# Patient Record
Sex: FEMALE | Race: WHITE | NOT HISPANIC OR LATINO | Employment: UNEMPLOYED | URBAN - METROPOLITAN AREA
[De-identification: names, ages, dates, MRNs, and addresses within clinical notes are randomized per-mention and may not be internally consistent; named-entity substitution may affect disease eponyms.]

---

## 2023-01-31 ENCOUNTER — HOSPITAL ENCOUNTER (OUTPATIENT)
Dept: RADIOLOGY | Facility: HOSPITAL | Age: 43
Discharge: HOME | End: 2023-01-31
Attending: OBSTETRICS & GYNECOLOGY
Payer: COMMERCIAL

## 2023-01-31 DIAGNOSIS — Z12.31 ENCOUNTER FOR SCREENING MAMMOGRAM FOR BREAST CANCER: ICD-10-CM

## 2023-01-31 PROCEDURE — 77067 SCR MAMMO BI INCL CAD: CPT | Mod: TC

## 2023-01-31 PROCEDURE — 77063 MAMMO DIGITAL SCREENING BILAT WITH TOMO: ICD-10-PCS | Mod: 26,,, | Performed by: STUDENT IN AN ORGANIZED HEALTH CARE EDUCATION/TRAINING PROGRAM

## 2023-01-31 PROCEDURE — 77063 BREAST TOMOSYNTHESIS BI: CPT | Mod: 26,,, | Performed by: STUDENT IN AN ORGANIZED HEALTH CARE EDUCATION/TRAINING PROGRAM

## 2023-01-31 PROCEDURE — 77067 MAMMO DIGITAL SCREENING BILAT WITH TOMO: ICD-10-PCS | Mod: 26,,, | Performed by: STUDENT IN AN ORGANIZED HEALTH CARE EDUCATION/TRAINING PROGRAM

## 2023-01-31 PROCEDURE — 77067 SCR MAMMO BI INCL CAD: CPT | Mod: 26,,, | Performed by: STUDENT IN AN ORGANIZED HEALTH CARE EDUCATION/TRAINING PROGRAM

## 2023-02-01 DIAGNOSIS — Z91.89 AT HIGH RISK FOR BREAST CANCER: Primary | ICD-10-CM

## 2023-05-02 ENCOUNTER — OFFICE VISIT (OUTPATIENT)
Dept: SURGERY | Facility: CLINIC | Age: 43
End: 2023-05-02
Payer: COMMERCIAL

## 2023-05-02 VITALS
OXYGEN SATURATION: 98 % | TEMPERATURE: 98 F | RESPIRATION RATE: 18 BRPM | SYSTOLIC BLOOD PRESSURE: 118 MMHG | BODY MASS INDEX: 29.93 KG/M2 | HEIGHT: 71 IN | DIASTOLIC BLOOD PRESSURE: 82 MMHG | HEART RATE: 105 BPM | WEIGHT: 213.81 LBS

## 2023-05-02 DIAGNOSIS — Z80.3 FAMILY HISTORY OF BREAST CANCER: ICD-10-CM

## 2023-05-02 DIAGNOSIS — Z12.31 SCREENING MAMMOGRAM FOR BREAST CANCER: ICD-10-CM

## 2023-05-02 DIAGNOSIS — Z91.89 AT HIGH RISK FOR BREAST CANCER: Primary | ICD-10-CM

## 2023-05-02 PROCEDURE — 3008F PR BODY MASS INDEX (BMI) DOCUMENTED: ICD-10-PCS | Mod: CPTII,S$GLB,, | Performed by: NURSE PRACTITIONER

## 2023-05-02 PROCEDURE — 3074F PR MOST RECENT SYSTOLIC BLOOD PRESSURE < 130 MM HG: ICD-10-PCS | Mod: CPTII,S$GLB,, | Performed by: NURSE PRACTITIONER

## 2023-05-02 PROCEDURE — 99999 PR PBB SHADOW E&M-EST. PATIENT-LVL V: ICD-10-PCS | Mod: PBBFAC,,, | Performed by: NURSE PRACTITIONER

## 2023-05-02 PROCEDURE — 1159F PR MEDICATION LIST DOCUMENTED IN MEDICAL RECORD: ICD-10-PCS | Mod: CPTII,S$GLB,, | Performed by: NURSE PRACTITIONER

## 2023-05-02 PROCEDURE — 99205 PR OFFICE/OUTPT VISIT, NEW, LEVL V, 60-74 MIN: ICD-10-PCS | Mod: S$GLB,,, | Performed by: NURSE PRACTITIONER

## 2023-05-02 PROCEDURE — 3008F BODY MASS INDEX DOCD: CPT | Mod: CPTII,S$GLB,, | Performed by: NURSE PRACTITIONER

## 2023-05-02 PROCEDURE — 1160F RVW MEDS BY RX/DR IN RCRD: CPT | Mod: CPTII,S$GLB,, | Performed by: NURSE PRACTITIONER

## 2023-05-02 PROCEDURE — 99999 PR PBB SHADOW E&M-EST. PATIENT-LVL V: CPT | Mod: PBBFAC,,, | Performed by: NURSE PRACTITIONER

## 2023-05-02 PROCEDURE — 3079F PR MOST RECENT DIASTOLIC BLOOD PRESSURE 80-89 MM HG: ICD-10-PCS | Mod: CPTII,S$GLB,, | Performed by: NURSE PRACTITIONER

## 2023-05-02 PROCEDURE — 3079F DIAST BP 80-89 MM HG: CPT | Mod: CPTII,S$GLB,, | Performed by: NURSE PRACTITIONER

## 2023-05-02 PROCEDURE — 3074F SYST BP LT 130 MM HG: CPT | Mod: CPTII,S$GLB,, | Performed by: NURSE PRACTITIONER

## 2023-05-02 PROCEDURE — 1160F PR REVIEW ALL MEDS BY PRESCRIBER/CLIN PHARMACIST DOCUMENTED: ICD-10-PCS | Mod: CPTII,S$GLB,, | Performed by: NURSE PRACTITIONER

## 2023-05-02 PROCEDURE — 1159F MED LIST DOCD IN RCRD: CPT | Mod: CPTII,S$GLB,, | Performed by: NURSE PRACTITIONER

## 2023-05-02 PROCEDURE — 99205 OFFICE O/P NEW HI 60 MIN: CPT | Mod: S$GLB,,, | Performed by: NURSE PRACTITIONER

## 2023-05-02 RX ORDER — MILK THISTLE 150 MG
CAPSULE ORAL
COMMUNITY

## 2023-05-02 RX ORDER — INDOMETHACIN 75 MG/1
75 CAPSULE, EXTENDED RELEASE ORAL NIGHTLY
COMMUNITY
Start: 2023-03-07

## 2023-05-02 RX ORDER — GABAPENTIN 100 MG/1
CAPSULE ORAL
COMMUNITY
Start: 2023-03-07

## 2023-05-02 RX ORDER — OMEPRAZOLE 20 MG/1
20 CAPSULE, DELAYED RELEASE ORAL
COMMUNITY
Start: 2023-03-07

## 2023-05-02 RX ORDER — SEMAGLUTIDE 2.68 MG/ML
2 INJECTION, SOLUTION SUBCUTANEOUS
COMMUNITY
Start: 2023-04-10

## 2023-05-02 RX ORDER — GABAPENTIN 300 MG/1
300 CAPSULE ORAL
COMMUNITY
Start: 2023-03-07

## 2023-05-02 RX ORDER — LEVOTHYROXINE SODIUM 50 UG/1
50 TABLET ORAL
COMMUNITY
Start: 2023-03-02

## 2023-05-02 RX ORDER — MV/FA/DHA/EPA/FISH OIL/SAW/GNK 400MCG-200
COMBINATION PACKAGE (EA) ORAL
COMMUNITY

## 2023-05-02 NOTE — PROGRESS NOTES
[facility] Breast Surg  Breast Surgical Oncology  New Patient Office Visit - H&P      Referring Provider:  Dr. Azucena Johnson     PCP: Networked reference to record PCT      Care Team: Patient Care Team:  Primary Doctor No as PCP - General    Chief Complaint:   Chief Complaint   Patient presents with    High Risk Visit     New patient presented for a High Risk visit due to TC score: 20.6%. Patient is well, no complaints of any breast issues.        Subjective:      History of Present Illness:  Zina Peguero  is a pleasant female patient who initially presents on  2023 at 42 y.o. years old for evaluation and assessment of risk for breast cancer based on  the Tyrer - Cuzick Breast Cancer Risk Model (> 20% indicates elevated lifetime risk). Her lifetime risk is calculated to be 24.1%. Haily 5 year risk 0.6%. Denies any breast issues including rashes, redness, pain, swelling, nipple discharge, or new lumps/masses. Performs self breast exams monthly.    Imagin23 SCR MG w/Lance: The breasts are heterogeneously dense, which may obscure small masses. There is no mammographic evidence of malignancy    Pathology: NONE      OB / GYN History   Menarche Onset:13  Menopause: Menopause: premenopausal  Hormonal birth control (duration): 24 years  Pregnancies: 0  Age at first child birth: NA  Child births: 0  Hysterectomy/Oophorectomy: hysterectomy without BSO, at age 42 (Fibroids)  HRT: no    Family History of Cancer (& age at diagnosis):  -   Family History   Problem Relation Age of Onset    Thyroid disease Mother     Skin cancer Father 67    Hypertension Father     Rheum arthritis Maternal Grandmother     Kidney cancer Maternal Grandfather 80    Breast cancer Paternal Aunt 55    Lung cancer Paternal Uncle 50    Prostate cancer Paternal Uncle 50    Skin cancer Maternal Uncle 45        Lifestyle:   BMI: Body mass index is 29.82 kg/m².     Other:  # of breast biopsies (when  and pathology results): 0  MG breast density: BIRADS C  Prior thoracic RT: none  Genetic testing: No  Ashkenazi Confucianist descent: No    Other History:     Past Medical History:   Diagnosis Date    Hashimoto's disease     Hemifacial spasm     Right side    Uterine fibroid         Past Surgical History:   Procedure Laterality Date    HYSTERECTOMY      MANDIBLE SURGERY Bilateral     neurosurgery Right         Social History     Socioeconomic History    Marital status: Single   Tobacco Use    Smoking status: Never    Smokeless tobacco: Never   Substance and Sexual Activity    Alcohol use: Not Currently    Drug use: Never    Sexual activity: Not Currently          There is no immunization history on file for this patient.     Medications/Allergies:     Current Outpatient Medications:     biotin/keratin (BIOTIN PLUS KERATIN ORAL), Take 5,000 mcg by mouth. 5000 mcg biotin-500mg kerotin, Disp: , Rfl:     cholecalciferol, vitD3,/vit K2 (VITAMIN D3-VITAMIN K2 ORAL), Take 5,000 Units by mouth Daily. Takes every 3 days, Disp: , Rfl:     curcumin-phosphatidylcholine 500 mg Cap, Take by mouth., Disp: , Rfl:     ferrous sulfate (SLOW RELEASE IRON ORAL), Take 45 mg by mouth., Disp: , Rfl:     gabapentin (NEURONTIN) 100 MG capsule, Take by mouth., Disp: , Rfl:     gabapentin (NEURONTIN) 300 MG capsule, Take 300 mg by mouth., Disp: , Rfl:     indomethacin (INDOCIN SR) 75 mg CpSR CR capsule, Take 75 mg by mouth every evening., Disp: , Rfl:     krill oil 500 mg Cap, Take by mouth., Disp: , Rfl:     mv-min/iron/folic/calcium/vitK (WOMEN'S MULTIVITAMIN ORAL), Take 1 tablet by mouth Daily. 1 a Day, Disp: , Rfl:     omeprazole (PRILOSEC) 20 MG capsule, Take 20 mg by mouth., Disp: , Rfl:     OZEMPIC 2 mg/dose (8 mg/3 mL) PnIj, Inject 2 mg into the skin every 7 days., Disp: , Rfl:     SYNTHROID 50 mcg tablet, Take 50 mcg by mouth., Disp: , Rfl:     Review of patient's allergies indicates:  No Known Allergies     Review of Systems:       Pertinent positives documented in History  Objective/Physical Exam     Vitals:    05/02/23 0920   BP: 118/82   Pulse: 105   Resp: 18   Temp: 97.9 °F (36.6 °C)     General: The patient is awake, alert and oriented times three. The patient is well nourished and in no acute distress.  Neck: There is no evidence of palpable cervical, supraclavicular or axillary adenopathy. The neck is supple. The thyroid is not enlarged.  Musculoskeletal: The patient has a normal range of motion of her bilateral upper extremities.  Chest: Examination of the chest wall fails to reveal any obvious abnormalities. Nonlabored breathing, symmetric expansion.  Breast:  Right: Examination of right breast fails to reveal any dominant masses or areas of significant focal nodularity. The nipple is everted without evidence of discharge. There is no skin dimpling with movement of the pectoralis. There are no significant skin changes overlying the breast.   Left: Examination of the left breast fails to reveal any dominant masses or areas of significant focal nodularity. The nipple is everted without evidence of discharge. There is no skin dimpling with movement of the pectoralis. There are no significant skin changes overlying the breast.  Abdomen: The abdomen is soft, flat, nontender and nondistended.  Integumentary: no rashes or skin lesions present  Neurologic: cranial nerves intact, no signs of peripheral neurological deficit, motor/sensory function intact     Assessment and Plan     There is no problem list on file for this patient.      Zina was seen today for high risk visit.    Diagnoses and all orders for this visit:    At high risk for breast cancer  -     MRI Breast w/wo Contrast, w/CAD, Bilateral; Future    Family history of breast cancer  -     MRI Breast w/wo Contrast, w/CAD, Bilateral; Future    Screening mammogram for breast cancer  -     MRI Breast w/wo Contrast, w/CAD, Bilateral;  Future      --------------------------------------------------------------------------------------------------------------  After the initial clinical evaluation nearly 30 minutes were on counseling the patients regarding the options for management. Risk reduction strategies were discussed.     1. Lifestyle factors: As with other types of cancer, studies continue to show that various lifestyle factors may contribute to the development of breast cancer.     Weight: Recent studies have shown that postmenopausal women who are overweight or obese have an increased risk of breast cancer. These women also have a higher risk of having the cancer come back after treatment.     Physical activity: Decreased physical activity is associated with an increased risk of developing breast cancer and a higher risk of having the cancer come back after treatment. Regular physical activity may protect against breast cancer by helping women maintain a healthy body weight, lowering hormone levels, or causing changes in a womens metabolism or immune factors.     Alcohol: Current research suggests that having more than 1 to 2 alcoholic drinks, including beer, wine, and spirits, per day raises the risk of breast cancer, as well as the risk of having the cancer come back after treatment.     Food: There is no reliable research that confirms that eating or avoiding specific foods reduces the risk of developing breast cancer or having the cancer come back after treatment. However, eating more fruits and vegetables and fewer animal fats is linked with many health benefits.     2. Prevention:  Surgery to lower cancer risk: For women with BRCA1 or BRCA2 genetic mutations, which substantially increase the risk of breast cancer, preventive removal of the breasts may be considered. The procedure, called a prophylactic mastectomy, appears to reduce the risk of developing breast cancer by at least 95%. Women with these mutations should also consider  "the preventive removal of the ovaries and fallopian tubes, called a prophylactic salpingo-oophorectomy. This procedure can reduce the risk of developing ovarian cancer, as well as breast cancer, by stopping the ovaries from making estrogen.      Drugs to lower cancer risk (Chemoprevention): Women who have a higher than usual risk of developing breast cancer may consider certain drugs that may help prevent breast cancer. This approach may also be called "chemoprevention." For breast cancer, this is the use of hormone-blocking drugs to reduce cancer risk. The drugs, tamoxifen (Soltamox) and raloxifene (Evista), are approved by the U.S. Food and Drug Administration (FDA) to lower breast cancer risk. These drugs are called selective estrogen receptor modulators (SERMs) and are not chemotherapy. A SERM is a medication that blocks estrogen receptors in some tissues and not others. Both women who have gone through menopause and those who have not may take tamoxifen. Raloxifene is only approved for women who have gone through menopause. Each drug also has different side effects.     Aromatase inhibitors (AIs) have also been shown to lower breast cancer risk. AIs are a type of hormone-blocking treatment that reduces the amount of estrogen in a woman's body by stopping tissues and organs other than the ovaries from producing estrogen. They can only be used by women who have gone through menopause. However, no AIs have been approved by the FDA for lowering breast cancer risk in women who do not have the disease.     3. Surveillance: Women at greater than 20 percent average lifetime risk of invasive breast cancer based mainly on family history     Clinical Breast exam: Every 6-12 months starting at age found to be at increased risk by risk model     Mammogram: Every year starting 10 years younger than the youngest breast cancer case in the family (but not before age 30)     Breast MRI: Every year starting 10 years younger than " the youngest breast cancer case in the family (but not before age 25). If you have a first-degree relative with a BRCA1/2 gene mutation, youre encouraged to get genetic counseling and/or testing before getting MRI as part of screening (for those who do not wish to have genetic testing, MRI is recommended). Breast MRI in combination with mammography is better than mammography alone at finding breast cancer in certain women at higher than average risk.    --------------------------------------------------------------------------------------------------------------    PLAN:    1. Lifestyle - Healthy lifestyle guidelines were reviewed. She was encouraged to engage in regular exercise, maintain a healthy body weight, and avoid excessive alcohol consumption. Healthy nutritional guidelines were also discussed. Self-breast examination was reviewed with the patient in detail and she was encouraged to perform this on a monthly basis.    2. Surveillance - She desires undergoing high risk screening with annual screening mammograms and breast MRIs. In the absence of significant clinical findings in the interval, I recommend MRI July 2023, Annual MG due 02/01/24. Continue to see OB/GYN for CBE. Recommend two CBE a year. One with us and one with your OB/GYN Provider. We recommend them to be at a six month interval.  She will see Dr. Azucena Johnson in December 2023    3. Prevention - We had a brief discussion/education about indications for preventative mastectomy or chemoprevention.  These methods are not recommended to her at this time.    4. Genetics - According to NCCN guidelines, she meets criteria for genetic testing. Referral to genetic counseling placed.    RTC in 6 months then we can get on a 6 month rotation with GYN.     All of her questions were answered.     Zia Almaguer  ANP    --------------------------------------------------------------------------------------------------------------  --------------------------------------------------------------------------------------------------------------  Total time on the date of the visit ranged from 60-74 mins ([id number]). Total time includes both face-to-face and non-face-to-face time personally spent by myself on the day of the visit.    Non-face-to-face time included:  _X_ preparing to see the patient such as reviewing the patient record  _X_ obtaining and reviewing separately obtained history  _X_ independently interpreting results  _X_ documenting clinical information in electronic health record.    Face-to-face time included:  _X_ performing an appropriate history and examination  _X_ communicating results to the patient  _X_ counseling and educating the patient  __ ordering appropriate medications  _x_ ordering appropriate tests  _X_ ordering appropriate procedures (including follow-up)  _X_ answering any questions the patient had    Total Time spent on date of visit: 60 minutes

## 2023-05-16 ENCOUNTER — OFFICE VISIT (OUTPATIENT)
Dept: HEMATOLOGY/ONCOLOGY | Facility: CLINIC | Age: 43
End: 2023-05-16
Payer: COMMERCIAL

## 2023-05-16 DIAGNOSIS — Z80.0 FAMILY HISTORY OF PANCREATIC CANCER: Primary | ICD-10-CM

## 2023-05-16 DIAGNOSIS — Z80.3 FAMILY HISTORY OF BREAST CANCER: ICD-10-CM

## 2023-05-16 DIAGNOSIS — Z80.8 FAMILY HISTORY OF MELANOMA: ICD-10-CM

## 2023-05-16 DIAGNOSIS — Z91.89 AT HIGH RISK FOR BREAST CANCER: ICD-10-CM

## 2023-05-16 PROCEDURE — 99205 PR OFFICE/OUTPT VISIT, NEW, LEVL V, 60-74 MIN: ICD-10-PCS | Mod: 95,,, | Performed by: NURSE PRACTITIONER

## 2023-05-16 PROCEDURE — 99205 OFFICE O/P NEW HI 60 MIN: CPT | Mod: 95,,, | Performed by: NURSE PRACTITIONER

## 2023-05-16 NOTE — PROGRESS NOTES
REFERRING PHYSICIAN: Zia Almaguer NP    Subjective:       Patient ID: Zina Peguero is a 42 y.o. female.    Chief Complaint: No personal history of cancer but a family history of cancer. Patient presented via Telemedicine Visit (audio and video) today for risk assessment, genetic counseling, and consideration for genetic testing.    HPI  Past Medical History:   Diagnosis Date    Hashimoto's disease     Hemifacial spasm     Right side    Uterine fibroid         Past Surgical History:   Procedure Laterality Date    HYSTERECTOMY      MANDIBLE SURGERY Bilateral     neurosurgery Right         Review of patient's allergies indicates:  Patient has no known allergies.     Review of Systems        Problem List Items Addressed This Visit    None       Oncology History    No history exists.      Family History   Problem Relation Age of Onset    Thyroid disease Mother     Skin cancer Mother         Squamous cell    Skin cancer Father 67        Squamous cell    Hypertension Father     Rheum arthritis Maternal Grandmother     Pancreatic cancer Maternal Grandfather 79    Melanoma Maternal Uncle 50        toe nail    Breast cancer Paternal Aunt 65    Lung cancer Other 50    Prostate cancer Other 50    Lung cancer Other     Breast cancer Other             Assessment:   Risk Assessment:  This patient is at increased risk of having an inherited genetic mutation that increases her risk for cancer. She meets criteria for genetic testing based on the National Comprehensive Cancer Network (NCCN) criteria due to a family history that includes pancreatic cancer (maternal grandfather) and melanoma (maternal uncle), with additional evidence on paternal side with breast cancer (paternal aunt) and prostate cancer (paternal great uncles x 2) (see family history and pedigree). Based on her likelihood of having a mutation, BRCA1/2 Analysis with [facility] CancerNext-Expanded+RNAinsight panel testing was described in detail.    Education  and Counseling:  [facility] CancerNext-Expanded evaluates a broad number of hereditary cancer syndromes to help define patients' cancer risk. This cancer panel tests (77 total): AIP, ALK, APC*, HOLLIE*, AXIN2, BAP1, BARD1, BLM, BMPR1A, BRCA1*, BRCA2*, BRIP1*, CDC73, CDH1*, CDK4, CDKN1B, CDKN2A, CHEK2*, CTNNA1, DICER1, FANCC, FH, FLCN, GALNT12, KIF1B, LZTR1, MAX, MEN1, MET, MLH1*, MSH2*, MSH3, MSH6*, MUTYH*, NBN, NF1*, NF2, NTHL1, PALB2*, PHOX2B, PMS2*, POT1, AJYNZ5D, PTCH1, PTEN*, RAD51C*, RAD51D*, RB1, RECQL, RET, SDHA, SDHAF2, SDHB, SDHC, SDHD, SMAD4, SMARCA4, SMARCB1, SMARCE1, STK11, SUFU, RVNH296, TP53*, TSC1, TSC2, VHL and XRCC2 (sequencing and deletion/duplication); EGFR, EGLN1, HOXB13, KIT, MITF, PDGFRA, POLD1 and POLE (sequencing only); EPCAM and GREM1 (deletion/duplication only). DNA and RNA analyses performed for * genes.    Risks of cancer associated with inherited cancer predisposition mutations were discussed in detail.  If a mutation were found, this patient would have a significantly increased risk for cancer.  Inherited cancer syndromes included in this test, may have different, but still significant risk for cancer.  Risk of cancer with any particular gene mutation will be discussed at the time of results disclosure and based on the results.    The availability of clinical management options for inherited cancer predisposition mutation carriers was discussed, including increased surveillance, chemoprevention, and prophylactic surgery. Details of the testing process, including benefits and limitations of genetic analysis as well as the implications of possible test results, were discussed.  Because this patient is the first member of her family to be tested comprehensive panel testing was presented.  Related insurance issues were discussed.      Summary:  This patient was evaluated for hereditary risk of cancer and was found to be at an increased risk of having an inherited cancer predisposition  gene mutation.  The option of genetic testing was explained in detail, including the possible impact of this information on family members.  Since this patient wishes to proceed with testing an informed consent was obtained and blood drawn and sent to [facility].  Results will be expected 4 weeks from this time.  A follow-up appointment will be scheduled for results disclosure.       The patient location is: home.     Visit type: audiovisual    Face to Face time with patient: 45 minutes  >60 minutes of total time spent on the encounter, which includes face to face time and non-face to face time preparing to see the patient (eg, review of tests), Obtaining and/or reviewing separately obtained history, Documenting clinical information in the electronic or other health record, Independently interpreting results (not separately reported) and communicating results to the patient/family/caregiver, or Care coordination (not separately reported).       Each patient to whom he or she provides medical services by telemedicine is:  (1) informed of the relationship between the physician and patient and the respective role of any other health care provider with respect to management of the patient; and (2) notified that he or she may decline to receive medical services by telemedicine and may withdraw from such care at any time.    WILLIS FARMER, PhD

## 2023-07-05 ENCOUNTER — OFFICE VISIT (OUTPATIENT)
Dept: HEMATOLOGY/ONCOLOGY | Facility: CLINIC | Age: 43
End: 2023-07-05
Payer: COMMERCIAL

## 2023-07-05 DIAGNOSIS — Z80.3 FAMILY HISTORY OF BREAST CANCER: Primary | ICD-10-CM

## 2023-07-05 DIAGNOSIS — Z80.0 FAMILY HISTORY OF PANCREATIC CANCER: ICD-10-CM

## 2023-07-05 DIAGNOSIS — Z80.8 FAMILY HISTORY OF MELANOMA: ICD-10-CM

## 2023-07-05 PROCEDURE — 99213 PR OFFICE/OUTPT VISIT, EST, LEVL III, 20-29 MIN: ICD-10-PCS | Mod: 95,,, | Performed by: NURSE PRACTITIONER

## 2023-07-05 PROCEDURE — 99213 OFFICE O/P EST LOW 20 MIN: CPT | Mod: 95,,, | Performed by: NURSE PRACTITIONER

## 2023-07-05 NOTE — PROGRESS NOTES
REFERRING PHYSICIAN: Zia Almaguer NP     Patient ID: Zina Peguero is a 42 y.o. female.    Chief Complaint: No personal history of cancer but a family history of cancer. Patient presented for genetic counseling on 5/16/2023 and was found appropriate for genetic testing based on the National Comprehensive Cancer Network (NCCN) criteria due to a family history that includes pancreatic cancer (maternal grandfather) and melanoma (maternal uncle), with additional evidence on paternal side with breast cancer (paternal aunt) and prostate cancer (paternal great uncles x 2) (see family history and pedigree). She signed consent, lab was drawn and sent to [facility] for BRCA1/2 Analyses with CancerNext-Expanded+RNAinsight panel testing. She presented via Telemedicine Visit (audio and video) today for results disclosure.     HPI  Past Medical History:   Diagnosis Date    Hashimoto's disease     Hemifacial spasm     Right side    Uterine fibroid       Review of patient's allergies indicates:  No Known Allergies   Review of Systems   Constitutional:  Negative for appetite change and unexpected weight change.   HENT:  Positive for mouth sores.    Eyes:  Negative for visual disturbance.   Respiratory:  Negative for cough and shortness of breath.    Cardiovascular:  Negative for chest pain.   Gastrointestinal:  Negative for abdominal pain and diarrhea.   Genitourinary:  Negative for frequency.   Musculoskeletal:  Negative for back pain.   Integumentary:  Negative for rash.   Neurological:  Positive for headaches.   Hematological:  Negative for adenopathy.   Psychiatric/Behavioral:  The patient is not nervous/anxious.        Assessment:       Problem List Items Addressed This Visit    None    Oncology History    No history exists.            Family History:  Family History   Problem Relation Age of Onset    Thyroid disease Mother     Skin cancer Mother         Squamous cell    Skin cancer Father 67        Squamous cell     Hypertension Father     Rheum arthritis Maternal Grandmother     Pancreatic cancer Maternal Grandfather 79    Melanoma Maternal Uncle 50        toe nail    Breast cancer Paternal Aunt 65    Lung cancer Other 50    Prostate cancer Other 50    Lung cancer Other     Breast cancer Other         Plan:     Genetic testing was appropriate for this patient because of her personal and family history. This comprehensive [facility] CancerNext analysis indicated that there was no deleterious mutation in  (77 total): AIP, ALK, APC, HOLLIE, AXIN2, BAP1, BARD1, BLM, BMPR1A, BRCA1, BRCA2, BRIP1, CDC73, CDH1, CDK4, CDKN1B, CDKN2A, CHEK2, CTNNA1, DICER1, FANCC, FH, FLCN, GALNT12, KIF1B, LZTR1, MAX, MEN1, MET, MLH1, MSH2, MSH3, MSH6, MUTYH, NBN, NF1, NF2, NTHL1, PALB2, PHOX2B, PMS2, POT1, ZTLML2A, PTCH1, PTEN, RAD51C, RAD51D, RB1, RECQL, RET, SDHA, SDHAF2, SDHB, SDHC, SDHD, SMAD4, SMARCA4, SMARCB1, SMARCE1, STK11, SUFU, [id number], TP53, TSC1, TSC2, VHL and XRCC2 (sequencing and deletion/duplication); EGFR, EGLN1, HOXB13, KIT, MITF, PDGFRA, POLD1 and POLE (sequencing only); EPCAM and GREM1 (deletion/duplication only). RNA data is routinely analyzed for use in variant interpretation for all genes. However, there were three (3) variants of uncertain significance (VUS): AXIN2 p.F570V, BAP1 p.R383H, CDH1 p.P822S. Both the AXIN2 and BAP1 vaiants are predicted to be tolerated (benign) by in silico analysis. The CDH1 variant in silico prediction is inconclusive. However, there are currently insufficient data to determine if any of these variants does or does not cause increased cancer risk. Therefore, the contribution of each variant to the relative risk of cancer cannot be established from this analysis. If as a result of ongoing reclassification efforts, [facility] should determine that any of these variants becomes clinically actionable, an amended report will be sent to me as healthcare provider. I will then contact the patient for a  discussion of implications of this new information and a healthcare plan will be made accordingly.      A copy of her result will be emailed to [email or web address]     The patient location is: home.     Visit type: audiovisual    Face to Face time with patient: 15 minutes  20 minutes of total time spent on the encounter, which includes face to face time and non-face to face time preparing to see the patient (eg, review of tests), Obtaining and/or reviewing separately obtained history, Documenting clinical information in the electronic or other health record, Independently interpreting results (not separately reported) and communicating results to the patient/family/caregiver, or Care coordination (not separately reported).       Each patient to whom he or she provides medical services by telemedicine is:  (1) informed of the relationship between the physician and patient and the respective role of any other health care provider with respect to management of the patient; and (2) notified that he or she may decline to receive medical services by telemedicine and may withdraw from such care at any time.     WILLIS FARMER, PhD

## 2023-07-18 ENCOUNTER — APPOINTMENT (OUTPATIENT)
Dept: RADIOLOGY | Facility: HOSPITAL | Age: 43
End: 2023-07-18
Attending: NURSE PRACTITIONER
Payer: COMMERCIAL

## 2023-07-18 DIAGNOSIS — Z12.31 SCREENING MAMMOGRAM FOR BREAST CANCER: ICD-10-CM

## 2023-07-18 DIAGNOSIS — Z91.89 AT HIGH RISK FOR BREAST CANCER: ICD-10-CM

## 2023-07-18 DIAGNOSIS — Z80.3 FAMILY HISTORY OF BREAST CANCER: ICD-10-CM

## 2023-07-18 PROCEDURE — A9577 INJ MULTIHANCE: HCPCS | Performed by: NURSE PRACTITIONER

## 2023-07-18 PROCEDURE — 77049 MRI BREAST C-+ W/CAD BI: CPT | Mod: 26,,, | Performed by: STUDENT IN AN ORGANIZED HEALTH CARE EDUCATION/TRAINING PROGRAM

## 2023-07-18 PROCEDURE — 77049 MRI BREAST C-+ W/CAD BI: CPT | Mod: TC

## 2023-07-18 PROCEDURE — 77049 MRI BREAST W/WO CONTRAST, W/CAD, BILATERAL: ICD-10-PCS | Mod: 26,,, | Performed by: STUDENT IN AN ORGANIZED HEALTH CARE EDUCATION/TRAINING PROGRAM

## 2023-07-18 PROCEDURE — 25500020 PHARM REV CODE 255: Performed by: NURSE PRACTITIONER

## 2023-07-18 RX ADMIN — GADOBENATE DIMEGLUMINE 20 ML: 529 INJECTION, SOLUTION INTRAVENOUS at 10:07

## 2023-11-06 NOTE — PROGRESS NOTES
[facility] Breast Surg  Breast Surgical Oncology  High Risk Follow Up Visit       Referring Provider:  No ref. provider found     PCP: Networked reference to record PCT      Care Team: Patient Care Team:  No, Primary Doctor as PCP - Azucena Davis MD as Consulting Physician (Obstetrics and Gynecology)    Chief Complaint:   Chief Complaint   Patient presents with    Follow-up     6 month CBE/High Risk Followup - patient reports no breast concerns        Subjective:      Interval: 2023 Patient presents for high risk follow up visit. She is doing well today. Patient has history of fibrocystic breast. She denies any breast issues including rashes, redness, pain, swelling, nipple discharge, or new lumps/masses. Performs self breast exams monthly. She had a hysterectomy without BSO earlier this year, so she no longer has cycles.  She is not experiencing any menopausal symptoms. She had genetic testing done in interim, and testing resulted as negative. She states she is still living a healthy lifestyle. She does not smoke and she drinks alcohol on occasion.     History of Present Illness:  Zina Peguero  is a pleasant female patient who initially presented at 42 years old for evaluation of risk for breast cancer based on  the Tyrer - Cuzick Breast Cancer Risk Model (> 20% indicates elevated lifetime risk). Her lifetime risk is calculated to be 24.1%. Haily 5 year risk 0.7%.    Imagin23 SCR MG w/Lance: The breasts are heterogeneously dense, which may obscure small masses. There is no mammographic evidence of malignancy.  23 Breast MRI: No MRI evidence of malignancy in either breast. Bilateral cystic mastopathy is benign.  BI-RADS 2: Benign.      Pathology: NONE      OB / GYN History   Menarche Onset:13  Menopause: Menopause: premenopausal  Hormonal birth control (duration): 24 years  Pregnancies: 0  Age at first child birth: NA  Child births:  0  Hysterectomy/Oophorectomy: hysterectomy without BSO, at age 42 (Fibroids)  HRT: no    Family History of Cancer (& age at diagnosis):  -   Family History   Problem Relation Age of Onset    Thyroid disease Mother     Skin cancer Mother         Squamous cell    Skin cancer Father 67        Squamous cell    Hypertension Father     Rheum arthritis Maternal Grandmother     Pancreatic cancer Maternal Grandfather 79    Melanoma Maternal Uncle 50        toe nail    Breast cancer Paternal Aunt 65    Lung cancer Other 50    Prostate cancer Other 50    Lung cancer Other     Breast cancer Other         Lifestyle:   BMI: Body mass index is 30.35 kg/m².     Other:  # of breast biopsies (when and pathology results): 0  MG breast density: BIRADS C  Prior thoracic RT: none  Genetic testing: Patient has had genetic testing done.  Results were negative besides three (3) variants of uncertain significance (VUS): AXIN2 p.F570V, BAP1 p.R383H, CDH1 p.P822S.  Ashkenazi Sikhism descent: No    Other History:     Past Medical History:   Diagnosis Date    Hashimoto's disease     Hemifacial spasm     Right side    Uterine fibroid         Past Surgical History:   Procedure Laterality Date    BRAIN SURGERY  7/2017; 12/2017    HYSTERECTOMY      Partial Hysterectomy 4/2023    MANDIBLE SURGERY Bilateral     neurosurgery Right         Social History     Socioeconomic History    Marital status: Single   Tobacco Use    Smoking status: Never    Smokeless tobacco: Never   Substance and Sexual Activity    Alcohol use: Not Currently     Alcohol/week: 2.0 standard drinks of alcohol     Types: 2 Glasses of wine per week    Drug use: Never    Sexual activity: Not Currently     Partners: Male     Birth control/protection: None          There is no immunization history on file for this patient.     Medications/Allergies:     Current Outpatient Medications:     biotin/keratin (BIOTIN PLUS KERATIN ORAL), Take 5,000 mcg by mouth. 5000 mcg biotin-500mg kerotin,  Disp: , Rfl:     cholecalciferol, vitD3,/vit K2 (VITAMIN D3-VITAMIN K2 ORAL), Take 5,000 Units by mouth Daily. Takes every 3 days, Disp: , Rfl:     curcumin-phosphatidylcholine 500 mg Cap, Take by mouth., Disp: , Rfl:     ferrous sulfate (SLOW RELEASE IRON ORAL), Take 45 mg by mouth., Disp: , Rfl:     gabapentin (NEURONTIN) 100 MG capsule, Take by mouth., Disp: , Rfl:     gabapentin (NEURONTIN) 300 MG capsule, Take 300 mg by mouth., Disp: , Rfl:     indomethacin (INDOCIN SR) 75 mg CpSR CR capsule, Take 75 mg by mouth every evening., Disp: , Rfl:     krill oil 500 mg Cap, Take by mouth., Disp: , Rfl:     mv-min/iron/folic/calcium/vitK (WOMEN'S MULTIVITAMIN ORAL), Take 1 tablet by mouth Daily. 1 a Day, Disp: , Rfl:     omeprazole (PRILOSEC) 20 MG capsule, Take 20 mg by mouth., Disp: , Rfl:     SYNTHROID 50 mcg tablet, Take 50 mcg by mouth., Disp: , Rfl:     OZEMPIC 2 mg/dose (8 mg/3 mL) PnIj, Inject 2 mg into the skin every 7 days., Disp: , Rfl:     Review of patient's allergies indicates:  No Known Allergies     Review of Systems:    Pertinent positives documented in history.      Objective/Physical Exam     Vitals:    11/07/23 0843   BP: 135/89   Pulse: 64   Resp: 18   Temp: 98.1 °F (36.7 °C)       General: The patient is awake, alert and oriented times three. The patient is well nourished and in no acute distress.  Neck: There is no evidence of palpable cervical, supraclavicular or axillary adenopathy. The neck is supple. The thyroid is not enlarged.  Musculoskeletal: The patient has a normal range of motion of her bilateral upper extremities.  Chest: Examination of the chest wall fails to reveal any obvious abnormalities. Nonlabored breathing, symmetric expansion.  Breast:  Right:  Examination of right breast fails to reveal any dominant masses or areas of significant focal nodularity. The nipple is everted without evidence of discharge. There is no skin dimpling with movement of the pectoralis. There are no  significant skin changes overlying the breast.   Left: In the 5:00 region, there was about a 3cm mobile, well circumscribed lump 6 CMFN which is consistent with characterization of a benign cyst. (Patient has had cystic mastopathy noted on imaging in past). The nipple is everted without evidence of discharge. There is no skin dimpling with movement of the pectoralis. There are no significant skin changes overlying the breast.  Abdomen: The abdomen is soft, flat, nontender and nondistended.  Integumentary: no rashes or skin lesions present  Neurologic: cranial nerves intact, no signs of peripheral neurological deficit, motor/sensory function intact     Assessment and Plan     There is no problem list on file for this patient.      Zina was seen today for follow-up.    Diagnoses and all orders for this visit:    At high risk for breast cancer  -     Mammo Digital Screening Bilat w/ Lance; Future  -     MRI Breast w/wo Contrast, w/CAD, Bilateral; Future    Family history of breast cancer  -     Mammo Digital Screening Bilat w/ Lance; Future  -     MRI Breast w/wo Contrast, w/CAD, Bilateral; Future    Screening mammogram for breast cancer  -     Mammo Digital Screening Bilat w/ Lance; Future        --------------------------------------------------------------------------------------------------------------    PLAN:    1. Lifestyle - Healthy lifestyle guidelines were reviewed. She was encouraged to engage in regular exercise, maintain a healthy body weight, and avoid excessive alcohol consumption. Healthy nutritional guidelines were also discussed. Self-breast examination was reviewed with the patient in detail and she was encouraged to perform this on a monthly basis.    2. Surveillance - She desires undergoing high risk screening with annual screening mammograms and breast MRIs. In the absence of significant clinical findings in the interval, I recommend MRI July 2024, Annual MG due February 24. Continue to see OB/GYN  for CBE. Recommend two CBE a year. One with us and one with your OB/GYN Provider. We recommend them to be at a six month interval.  She will see Dr. Azucena Johnson in December 2023, so will see us back in 6 months to establish CBE q6 months scheduled.     3. Prevention - We had a brief discussion/education about indications for preventative mastectomy or chemoprevention.  These methods are not recommended to her at this time.    4. Genetics - Patient has had genetic testing done.  Results were negative besides three (3) variants of uncertain significance (VUS): AXIN2 p.F570V, BAP1 p.R383H, CDH1 p.P822S.    5. Follow-up:  RTC in 6 months.      All of her questions were answered.     Jacey Singh PA-C    --------------------------------------------------------------------------------------------------------------  --------------------------------------------------------------------------------------------------------------  Total time on the date of the visit ranged from 20-29 mins (28052). Total time includes both face-to-face and non-face-to-face time personally spent by myself on the day of the visit.     Non-face-to-face time included:  _X_ preparing to see the patient such as reviewing the patient record  __ obtaining and reviewing separately obtained history  _X_ independently interpreting results  _X_ documenting clinical information in electronic health record.     Face-to-face time included:  _X_ performing an appropriate history and examination  _X_ communicating results to the patient  _X_ counseling and educating the patient  __ ordering appropriate medications  __ ordering appropriate tests  _X_ ordering appropriate procedures (including follow-up)  _X_ answering any questions the patient had     Total Time spent on date of visit: 25 minutes

## 2023-11-07 ENCOUNTER — OFFICE VISIT (OUTPATIENT)
Dept: SURGERY | Facility: CLINIC | Age: 43
End: 2023-11-07
Payer: COMMERCIAL

## 2023-11-07 VITALS
DIASTOLIC BLOOD PRESSURE: 89 MMHG | OXYGEN SATURATION: 100 % | HEART RATE: 64 BPM | RESPIRATION RATE: 18 BRPM | SYSTOLIC BLOOD PRESSURE: 135 MMHG | TEMPERATURE: 98 F | BODY MASS INDEX: 30.47 KG/M2 | WEIGHT: 217.63 LBS | HEIGHT: 71 IN

## 2023-11-07 DIAGNOSIS — Z91.89 AT HIGH RISK FOR BREAST CANCER: Primary | ICD-10-CM

## 2023-11-07 DIAGNOSIS — Z12.31 SCREENING MAMMOGRAM FOR BREAST CANCER: ICD-10-CM

## 2023-11-07 DIAGNOSIS — Z80.3 FAMILY HISTORY OF BREAST CANCER: ICD-10-CM

## 2023-11-07 PROCEDURE — 3008F PR BODY MASS INDEX (BMI) DOCUMENTED: ICD-10-PCS | Mod: CPTII,S$GLB,,

## 2023-11-07 PROCEDURE — 99999 PR PBB SHADOW E&M-EST. PATIENT-LVL V: CPT | Mod: PBBFAC,,,

## 2023-11-07 PROCEDURE — 3079F DIAST BP 80-89 MM HG: CPT | Mod: CPTII,S$GLB,,

## 2023-11-07 PROCEDURE — 1159F MED LIST DOCD IN RCRD: CPT | Mod: CPTII,S$GLB,,

## 2023-11-07 PROCEDURE — 3075F SYST BP GE 130 - 139MM HG: CPT | Mod: CPTII,S$GLB,,

## 2023-11-07 PROCEDURE — 99999 PR PBB SHADOW E&M-EST. PATIENT-LVL V: ICD-10-PCS | Mod: PBBFAC,,,

## 2023-11-07 PROCEDURE — 1159F PR MEDICATION LIST DOCUMENTED IN MEDICAL RECORD: ICD-10-PCS | Mod: CPTII,S$GLB,,

## 2023-11-07 PROCEDURE — 99213 PR OFFICE/OUTPT VISIT, EST, LEVL III, 20-29 MIN: ICD-10-PCS | Mod: S$GLB,,,

## 2023-11-07 PROCEDURE — 3008F BODY MASS INDEX DOCD: CPT | Mod: CPTII,S$GLB,,

## 2023-11-07 PROCEDURE — 3075F PR MOST RECENT SYSTOLIC BLOOD PRESS GE 130-139MM HG: ICD-10-PCS | Mod: CPTII,S$GLB,,

## 2023-11-07 PROCEDURE — 1160F RVW MEDS BY RX/DR IN RCRD: CPT | Mod: CPTII,S$GLB,,

## 2023-11-07 PROCEDURE — 99213 OFFICE O/P EST LOW 20 MIN: CPT | Mod: S$GLB,,,

## 2023-11-07 PROCEDURE — 3079F PR MOST RECENT DIASTOLIC BLOOD PRESSURE 80-89 MM HG: ICD-10-PCS | Mod: CPTII,S$GLB,,

## 2023-11-07 PROCEDURE — 1160F PR REVIEW ALL MEDS BY PRESCRIBER/CLIN PHARMACIST DOCUMENTED: ICD-10-PCS | Mod: CPTII,S$GLB,,

## 2024-02-01 ENCOUNTER — HOSPITAL ENCOUNTER (OUTPATIENT)
Dept: RADIOLOGY | Facility: HOSPITAL | Age: 44
Discharge: HOME | End: 2024-02-01
Payer: COMMERCIAL

## 2024-02-01 DIAGNOSIS — Z80.3 FAMILY HISTORY OF BREAST CANCER: ICD-10-CM

## 2024-02-01 DIAGNOSIS — Z91.89 AT HIGH RISK FOR BREAST CANCER: ICD-10-CM

## 2024-02-01 DIAGNOSIS — Z12.31 SCREENING MAMMOGRAM FOR BREAST CANCER: ICD-10-CM

## 2024-02-01 PROCEDURE — 77067 SCR MAMMO BI INCL CAD: CPT | Mod: 26,,, | Performed by: STUDENT IN AN ORGANIZED HEALTH CARE EDUCATION/TRAINING PROGRAM

## 2024-02-01 PROCEDURE — 77067 SCR MAMMO BI INCL CAD: CPT | Mod: TC

## 2024-02-01 PROCEDURE — 77063 BREAST TOMOSYNTHESIS BI: CPT | Mod: 26,,, | Performed by: STUDENT IN AN ORGANIZED HEALTH CARE EDUCATION/TRAINING PROGRAM

## 2024-02-09 DIAGNOSIS — G51.39 HEMIFACIAL SPASM: Primary | ICD-10-CM

## 2024-04-09 ENCOUNTER — OFFICE VISIT (OUTPATIENT)
Dept: NEUROLOGY | Facility: CLINIC | Age: 44
End: 2024-04-09
Payer: COMMERCIAL

## 2024-04-09 VITALS
BODY MASS INDEX: 29.82 KG/M2 | WEIGHT: 213 LBS | SYSTOLIC BLOOD PRESSURE: 140 MMHG | DIASTOLIC BLOOD PRESSURE: 84 MMHG | HEIGHT: 71 IN

## 2024-04-09 DIAGNOSIS — G51.39 HEMIFACIAL SPASM: Primary | ICD-10-CM

## 2024-04-09 PROCEDURE — 3077F SYST BP >= 140 MM HG: CPT | Mod: CPTII,S$GLB,, | Performed by: SPECIALIST

## 2024-04-09 PROCEDURE — 3008F BODY MASS INDEX DOCD: CPT | Mod: CPTII,S$GLB,, | Performed by: SPECIALIST

## 2024-04-09 PROCEDURE — 99999 PR PBB SHADOW E&M-EST. PATIENT-LVL IV: CPT | Mod: PBBFAC,,, | Performed by: SPECIALIST

## 2024-04-09 PROCEDURE — 99205 OFFICE O/P NEW HI 60 MIN: CPT | Mod: S$GLB,,, | Performed by: SPECIALIST

## 2024-04-09 PROCEDURE — 3079F DIAST BP 80-89 MM HG: CPT | Mod: CPTII,S$GLB,, | Performed by: SPECIALIST

## 2024-04-09 PROCEDURE — 1159F MED LIST DOCD IN RCRD: CPT | Mod: CPTII,S$GLB,, | Performed by: SPECIALIST

## 2024-04-09 RX ORDER — TIZANIDINE 2 MG/1
2 TABLET ORAL 2 TIMES DAILY
COMMUNITY

## 2024-04-09 RX ORDER — TIRZEPATIDE 7.5 MG/.5ML
0.75 INJECTION, SOLUTION SUBCUTANEOUS
COMMUNITY
Start: 2024-04-05

## 2024-04-09 NOTE — PROGRESS NOTES
"Subjective:      @Patient ID: Zina Peguero is a 43 y.o. female.    Chief Complaint: R HF spasm     HPI:              NP ref by Dr Mendoza for neuro cons to eval for Hemifacial (Pt had 2 decompression surgeries for hemifacial spasms and the spasms have returned. Rt eye twitching and it pulls her face and lips. Pt has been having Dysport injection w Dr Topete and it helps a little and she has been having to pay cash. )    See also 'facesheet' under media poss handwr notes     Review of Systems       [x] Single     []     [] []   [x] Working     [] Retired, worked as:   [x] Drives     [] Does not drive   ----------------------------  Hashimoto's disease  Hemifacial spasm      Comment:  Right side  Uterine fibroid  ..  Current Outpatient Medications   Medication Instructions    biotin/keratin (BIOTIN PLUS KERATIN ORAL) 5,000 mcg, Oral, 5000 mcg biotin-500mg kerotin    cholecalciferol, vitD3,/vit K2 (VITAMIN D3-VITAMIN K2 ORAL) 5,000 Units, Oral, Daily, Takes every 3 days    curcumin-phosphatidylcholine 500 mg Cap Oral    ferrous sulfate (SLOW RELEASE IRON ORAL) 45 mg, Oral    gabapentin (NEURONTIN) 100 MG capsule Oral    gabapentin (NEURONTIN) 300 mg, Oral    indomethacin (INDOCIN SR) 75 mg, Oral, Nightly    krill oil 500 mg Cap Oral    mv-min/iron/folic/calcium/vitK (WOMEN'S MULTIVITAMIN ORAL) 1 tablet, Oral, Daily, 1 a Day    omeprazole (PRILOSEC) 20 mg, Oral    OZEMPIC 2 mg, Subcutaneous, Every 7 days    SYNTHROID 50 mcg, Oral    tiZANidine (ZANAFLEX) 2 mg, Oral, 2 times daily    TURMERIC ORAL Oral    ZEPBOUND 0.75 mg, Subcutaneous, Every 7 days      Objective:      Exam:   Visit Vitals  BP (!) 140/84   Ht 5' 11" (1.803 m)   Wt 96.6 kg (213 lb)   BMI 29.71 kg/m²     General Exam  If Accompanied, by__       body habitus_ Body mass index is 29.71 kg/m².  Gen exam     Neurological:  []Normal neuro exam         Or   Exam comments:  R hf spasm and R facial synkinesis   T hearing loss to finger " rustle and vibratome and trouble w tandem gait       Neuroimaging: Images and imaging reports reviewed.   Rads summary: 2022 b MRI [facility] [place]: Stable postop ch related to right facial nerve decompression   Interval development of mild, nonspecific subcortical white matter signal hyperintensities, which may represent normal aging process, migraine changes, or mild chronic microvascular ischemia.   Images reviewed 4.9.24  My comments: no brainstem signal changes   They commented on wmh's   I don't see much     Labs:    [x]  New Patient         [x]  Multiple Issues/ diagnoses or problems  [if not enumerated in note then discussed but not documented]    Complexity of Data:   [x] High    [] Moderate   [] Images and reports reviewed [] History obtained from accompaniment  [] Studies ordered [] Studies consid or discussed, not ordered   [] Differential Diagnoses discussed       Risks:   [] High     [x] Moderate   [] (poss or def) neurodegenerative condition [] () autoimmune condition with possibility of flares or unexpected attack  [] () seiz d.o. with possib of recurr seiz's  [] Cerebrovasc ds with risk of recurrent stroke  [] CNS meds (and/or) potentially high risk non CNS meds taken or discussed which may cause med or behav SE's  [] Fall risk [] Driving discussed  [] Diagnosis unclear or DDx wide making risk uncertain   []:    MDM:    [x] High         Assessment/Plan:         ICD-10-CM ICD-9-CM   1. Hemifacial spasm  G51.39 351.8         Other Comments / Follow Up:           Orders Placed This Encounter   Procedures    Prior authorization Order     She may be interested in visiting with local neurosurgeon   Aim revisit in weeks ahead for facial xeomin injections   Patient Instructions          MD CHRISTOS HajiA FAAN, [facility]  Neuroscience Center Medical Director   [facility]

## 2024-04-18 ENCOUNTER — PROCEDURE VISIT (OUTPATIENT)
Dept: NEUROLOGY | Facility: CLINIC | Age: 44
End: 2024-04-18
Payer: COMMERCIAL

## 2024-04-18 VITALS — WEIGHT: 215 LBS | BODY MASS INDEX: 30.1 KG/M2 | HEIGHT: 71 IN

## 2024-04-18 DIAGNOSIS — G51.39 HEMIFACIAL SPASM, UNSPECIFIED LATERALITY: ICD-10-CM

## 2024-04-18 DIAGNOSIS — G51.31 HEMIFACIAL SPASM OF RIGHT SIDE OF FACE: Primary | ICD-10-CM

## 2024-04-18 PROCEDURE — 64612 DESTROY NERVE FACE MUSCLE: CPT | Mod: 50,S$GLB,, | Performed by: SPECIALIST

## 2024-04-18 NOTE — PROCEDURES
Botox given for R HF spasm   Informed consent had been obtained     Botulinum toxin dilution (xeomin) 1cc per 100 u     32 gauge half inch needle used     10 u each of following sites:  ,  orbicularis oculi, Upper lateral brow,   Lateral canthus,   orbicularis oculi, lower lateral / malar  [Had intended 5u but I miscalculated based on dilution being 1cc instead of the 2 cc I usually do for botox brain for migraine or bleph or hf spasm]  Divulged my error to patient and apologized for such     Gave lesser amounts left ,  orbicularis oculi, Upper lateral brow,   Lateral canthus,   orbicularis oculi, lower lateral / malar    Gave 5 u R nasolab fold and 2.5 L     total of 62.5u overall   37.5 u wasted      Tolerated well wo complications            The reason a toxin other than Botox brand used is due to prior antibodies being demonstrated to botox brand     She is aware may have ptosis and pronounced face asymmetry in upper face due to the unintended greater dose around R eye vs what was intended but thankfully will be time limited

## 2024-05-09 ENCOUNTER — OFFICE VISIT (OUTPATIENT)
Dept: SURGERY | Facility: CLINIC | Age: 44
End: 2024-05-09
Payer: COMMERCIAL

## 2024-05-09 VITALS
OXYGEN SATURATION: 100 % | RESPIRATION RATE: 18 BRPM | HEIGHT: 71 IN | BODY MASS INDEX: 29.93 KG/M2 | DIASTOLIC BLOOD PRESSURE: 89 MMHG | TEMPERATURE: 98 F | HEART RATE: 69 BPM | SYSTOLIC BLOOD PRESSURE: 125 MMHG | WEIGHT: 213.81 LBS

## 2024-05-09 DIAGNOSIS — Z80.3 FAMILY HISTORY OF BREAST CANCER: ICD-10-CM

## 2024-05-09 DIAGNOSIS — Z12.31 SCREENING MAMMOGRAM FOR BREAST CANCER: ICD-10-CM

## 2024-05-09 DIAGNOSIS — Z91.89 AT HIGH RISK FOR BREAST CANCER: Primary | ICD-10-CM

## 2024-05-09 PROCEDURE — 99214 OFFICE O/P EST MOD 30 MIN: CPT | Mod: S$GLB,,,

## 2024-05-09 PROCEDURE — 1159F MED LIST DOCD IN RCRD: CPT | Mod: CPTII,S$GLB,,

## 2024-05-09 PROCEDURE — 3008F BODY MASS INDEX DOCD: CPT | Mod: CPTII,S$GLB,,

## 2024-05-09 PROCEDURE — 3074F SYST BP LT 130 MM HG: CPT | Mod: CPTII,S$GLB,,

## 2024-05-09 PROCEDURE — 1160F RVW MEDS BY RX/DR IN RCRD: CPT | Mod: CPTII,S$GLB,,

## 2024-05-09 PROCEDURE — 3079F DIAST BP 80-89 MM HG: CPT | Mod: CPTII,S$GLB,,

## 2024-05-09 PROCEDURE — 99999 PR PBB SHADOW E&M-EST. PATIENT-LVL V: CPT | Mod: PBBFAC,,,

## 2024-05-09 RX ORDER — FAMOTIDINE 20 MG/1
20 TABLET, FILM COATED ORAL DAILY
COMMUNITY

## 2024-05-09 NOTE — PROGRESS NOTES
[facility] Breast Surg  Breast Surgical Oncology  High Risk Follow Up Visit       Referring Provider:  No ref. provider found     PCP: Networked reference to record PCT      Care Team: Patient Care Team:  No, Primary Doctor as PCP - Azucena Davis MD as Consulting Physician (Obstetrics and Gynecology)    Chief Complaint:   Chief Complaint   Patient presents with    Follow-up     Patient reports no breast concerns        Subjective:      Interval: 2024 Patient presents for high risk follow up visit. She is doing well today. Patient has history of fibrocystic breast. She denies any breast issues including rashes, redness, pain, swelling, nipple discharge, or new lumps/masses. Performs self breast exams monthly. She had a hysterectomy without BSO, so she no longer has cycles.  She is not experiencing any menopausal symptoms at this time.  In the interim, patient underwent screening mammogram in February which resulted as negative.  She is due for breast MRI in 2024, and it is already scheduled.  She has no other complaints or concerns today.      History of Present Illness:  Zina Peguero  is a pleasant female patient who initially presented at 42 years old for evaluation of risk for breast cancer based on  the Tyrer - Cuzick Breast Cancer Risk Model (> 20% indicates elevated lifetime risk). Her lifetime risk is calculated to be 24.1%. Haily 5 year risk 0.7%.    Imagin23 SCR MG w/Lance: The breasts are heterogeneously dense, which may obscure small masses. There is no mammographic evidence of malignancy.  23 Breast MRI: No MRI evidence of malignancy in either breast. Bilateral cystic mastopathy is benign.  BI-RADS 2: Benign.  2024 SC MG - There is no mammographic evidence of malignancy. BI-RADS: 1 Negative       Pathology: NONE      OB / GYN History   Menarche Onset:13  Menopause: Menopause: premenopausal  Hormonal birth control  (duration): 24 years  Pregnancies: 0  Age at first child birth: NA  Child births: 0  Hysterectomy/Oophorectomy: hysterectomy without BSO, at age 42 (Fibroids)  HRT: no    Family History of Cancer (& age at diagnosis):  -   Family History   Problem Relation Name Age of Onset    Thyroid disease Mother Julia     Skin cancer Mother Julia         Squamous cell    Skin cancer Father Carlos 67        Squamous cell    Hypertension Father Carlos     Rheum arthritis Maternal Grandmother Milagro Jamesaleo     Pancreatic cancer Maternal Grandfather  79    Melanoma Maternal Uncle  50        toe nail    Breast cancer Paternal Aunt Lima Katy 65    Lung cancer Other Pat great uncle 50    Prostate cancer Other Pat great uncle 50    Lung cancer Other Pat great uncle     Breast cancer Other Mat great aunt         Lifestyle:   BMI: Body mass index is 29.82 kg/m².     Other:  # of breast biopsies (when and pathology results): 0  MG breast density: BIRADS C  Prior thoracic RT: none  Genetic testing: Patient has had genetic testing done.  Results were negative besides three (3) variants of uncertain significance (VUS): AXIN2 p.F570V, BAP1 p.R383H, CDH1 p.P822S.  Ashkenazi Yarsanism descent: No    Other History:     Past Medical History:   Diagnosis Date    Hashimoto's disease     Hemifacial spasm     Right side    Uterine fibroid         Past Surgical History:   Procedure Laterality Date    BRAIN SURGERY  7/2017; 12/2017    HYSTERECTOMY      Partial Hysterectomy 4/2023    MANDIBLE SURGERY Bilateral     neurosurgery Right         Social History     Socioeconomic History    Marital status: Single   Tobacco Use    Smoking status: Never    Smokeless tobacco: Never   Substance and Sexual Activity    Alcohol use: Not Currently     Alcohol/week: 2.0 standard drinks of alcohol     Types: 2 Glasses of wine per week    Drug use: Never    Sexual activity: Not Currently     Partners: Male     Birth control/protection: None          There is no immunization  history on file for this patient.     Medications/Allergies:     Current Outpatient Medications:     biotin/keratin (BIOTIN PLUS KERATIN ORAL), Take 5,000 mcg by mouth. 5000 mcg biotin-500mg kerotin, Disp: , Rfl:     cholecalciferol, vitD3,/vit K2 (VITAMIN D3-VITAMIN K2 ORAL), Take 5,000 Units by mouth Daily. Takes every 3 days, Disp: , Rfl:     curcumin-phosphatidylcholine 500 mg Cap, Take by mouth., Disp: , Rfl:     famotidine (PEPCID) 20 MG tablet, Take 20 mg by mouth once daily., Disp: , Rfl:     ferrous sulfate (SLOW RELEASE IRON ORAL), Take 45 mg by mouth., Disp: , Rfl:     gabapentin (NEURONTIN) 100 MG capsule, Take by mouth., Disp: , Rfl:     gabapentin (NEURONTIN) 300 MG capsule, Take 300 mg by mouth., Disp: , Rfl:     indomethacin (INDOCIN SR) 75 mg CpSR CR capsule, Take 75 mg by mouth every evening., Disp: , Rfl:     krill oil 500 mg Cap, Take by mouth., Disp: , Rfl:     mv-min/iron/folic/calcium/vitK (WOMEN'S MULTIVITAMIN ORAL), Take 1 tablet by mouth Daily. 1 a Day, Disp: , Rfl:     SYNTHROID 50 mcg tablet, Take 50 mcg by mouth., Disp: , Rfl:     tiZANidine (ZANAFLEX) 2 MG tablet, Take 2 mg by mouth 2 (two) times daily., Disp: , Rfl:     TURMERIC ORAL, Take by mouth., Disp: , Rfl:     omeprazole (PRILOSEC) 20 MG capsule, Take 20 mg by mouth. (Patient not taking: Reported on 5/9/2024), Disp: , Rfl:     ZEPBOUND 7.5 mg/0.5 mL PnIj, Inject 0.75 mg into the skin every 7 days. (Patient not taking: Reported on 5/9/2024), Disp: , Rfl:     Review of patient's allergies indicates:  No Known Allergies     Review of Systems:    Pertinent positives documented in history.      Objective/Physical Exam     Vitals:    05/09/24 0829   BP: 125/89   Pulse: 69   Resp: 18   Temp: 97.8 °F (36.6 °C)       General: The patient is awake, alert and oriented times three. The patient is well nourished and in no acute distress.  Neck: There is no evidence of palpable cervical, supraclavicular or axillary adenopathy. The neck is  supple. The thyroid is not enlarged.  Musculoskeletal: The patient has a normal range of motion of her bilateral upper extremities.  Chest: Examination of the chest wall fails to reveal any obvious abnormalities. Nonlabored breathing, symmetric expansion.  Breast:  Right:  Examination of right breast fails to reveal any dominant masses or areas of significant focal nodularity. The nipple is everted without evidence of discharge. There is no skin dimpling with movement of the pectoralis. There are no significant skin changes overlying the breast.   Left:  Examination of right breast fails to reveal any dominant masses or areas of significant focal nodularity. The nipple is everted without evidence of discharge. There is no skin dimpling with movement of the pectoralis. There are no significant skin changes overlying the breast.  Abdomen: The abdomen is soft, flat, nontender and nondistended.  Integumentary: no rashes or skin lesions present  Neurologic: cranial nerves intact, no signs of peripheral neurological deficit, motor/sensory function intact     Assessment and Plan     Patient Active Problem List   Diagnosis    Hemifacial spasm       Zina was seen today for follow-up.    Diagnoses and all orders for this visit:    At high risk for breast cancer  -     Mammo Digital Screening Bilat w/ Lance; Future    Screening mammogram for breast cancer  -     Mammo Digital Screening Bilat w/ Lance; Future    Family history of breast cancer  -     Mammo Digital Screening Bilat w/ Lance; Future        --------------------------------------------------------------------------------------------------------------    PLAN:    1. Lifestyle - Healthy lifestyle guidelines were reviewed. She was encouraged to engage in regular exercise, maintain a healthy body weight, and avoid excessive alcohol consumption. Healthy nutritional guidelines were also discussed. Self-breast examination was reviewed with the patient in detail and she was  encouraged to perform this on a monthly basis.    2. Surveillance - She desires undergoing high risk screening with annual screening mammograms and breast MRIs. In the absence of significant clinical findings in the interval, I recommend undergoing scheduled breast MRI in July 2024 and SC MG in February 2025. RTC in 1 year.     3. Prevention - We had a brief discussion/education about indications for preventative mastectomy or chemoprevention.  These methods are not recommended to her at this time.    4. Genetics - Patient has had genetic testing done.  Results were negative besides three (3) variants of uncertain significance (VUS): AXIN2 p.F570V, BAP1 p.R383H, CDH1 p.P822S.    5. Other - Continue to see OB/GYN for CBE. Recommend two CBE a year. One with us and one with your OB/GYN Provider. We recommend them to be at a six month interval.     6. Follow-up:  RTC in 1 year.      All of her questions were answered.     Jacey Pettit PA-C     --------------------------------------------------------------------------------------------------------------  --------------------------------------------------------------------------------------------------------------    Total time on the date of the visit ranged from 30-39 mins (01939). Total time includes both face-to-face and non-face-to-face time personally spent by myself on the day of the visit.    Non-face-to-face time included:  _X_ preparing to see the patient such as reviewing the patient record  __ obtaining and reviewing separately obtained history  _X_ independently interpreting results  _X_ documenting clinical information in electronic health record.    Face-to-face time included:  _X_ performing an appropriate history and examination  _X_ communicating results to the patient  _X_ counseling and educating the patient  __ ordering appropriate medications  __ ordering appropriate tests  _X_ ordering appropriate procedures (including follow-up)  _X_ answering any  questions the patient had    Total Time spent on date of visit: 35 minutes

## 2024-05-13 ENCOUNTER — OFFICE VISIT (OUTPATIENT)
Dept: NEUROLOGY | Facility: CLINIC | Age: 44
End: 2024-05-13
Payer: COMMERCIAL

## 2024-05-13 DIAGNOSIS — G51.31 HEMIFACIAL SPASM OF RIGHT SIDE OF FACE: Primary | ICD-10-CM

## 2024-05-13 PROCEDURE — 99214 OFFICE O/P EST MOD 30 MIN: CPT | Mod: 95,,, | Performed by: SPECIALIST

## 2024-05-13 NOTE — PROGRESS NOTES
This is a telemedicine note. See bottom of note for boilerplate elements.     Zina Peguero is a 43 y.o. female seen today via telemedicine visit.   Subjective:    Patient ID: Zina Peguero is a 43 y.o. female.  Chief Complaint: fu R HF spasm after xeomin inj's   HPI:         thankfully had good result of R eye but R face weaker than she'd like   [I'd inadvert given her more than I had planned around R eye but lo and behold associated with a good result]     Current Outpatient Medications   Medication Instructions    biotin/keratin (BIOTIN PLUS KERATIN ORAL) 5,000 mcg, Oral, 5000 mcg biotin-500mg kerotin    cholecalciferol, vitD3,/vit K2 (VITAMIN D3-VITAMIN K2 ORAL) 5,000 Units, Oral, Daily, Takes every 3 days    curcumin-phosphatidylcholine 500 mg Cap Oral    famotidine (PEPCID) 20 mg, Oral, Daily    ferrous sulfate (SLOW RELEASE IRON ORAL) 45 mg, Oral    gabapentin (NEURONTIN) 100 MG capsule Oral    gabapentin (NEURONTIN) 300 mg, Oral    indomethacin (INDOCIN SR) 75 mg, Oral, Nightly    krill oil 500 mg Cap Oral    mv-min/iron/folic/calcium/vitK (WOMEN'S MULTIVITAMIN ORAL) 1 tablet, Oral, Daily, 1 a Day    omeprazole (PRILOSEC) 20 mg    SYNTHROID 50 mcg, Oral    tiZANidine (ZANAFLEX) 2 mg, Oral, 2 times daily    TURMERIC ORAL Oral    ZEPBOUND 0.75 mg, Every 7 days        Objective:      Exam Limited due to telemedicine restrictions.  There were no vitals taken for this visit.  if accompanied, by:_ n  Speech: ok   R eye twitch but not ptotic and R face pulls some but overall I am pleased with her cosmetic effect     Neuroimaging:  Images and imaging reports had been reviewed.  My comments: post surg sequela R facial n surgery     Labs:    meds:      ___ multiple issues/ diagnoses or problems [if not enumerated in note then discussed in encounter but not documented]    complexity of data     _;_high  _;_ images and reports reviewed     Risks     ;_mod   _:_  meds which may cause medical or behavioral side  effects (cosmetic se's)     MDM/Medical Decision Making     __high  ;_moderate   Assessment/Plan:     Problem List Items Addressed This Visit          Neuro    Hemifacial spasm - Primary       Other comments/ follow up:    Imaging orders (if any):   No orders of the defined types were placed in this encounter.        Plan reinject xeomin for R HF spasm late July / after 7/18     Video Time Documentation:  Spent 5 minutes with patient over video discussing health concerns.   Total time this visit:     10 minutes    This is a telemedicine note.   Patient was treated using telemedicine, real time audio and video, according to [facility] protocols. This visit is not recorded.  IBenson MD, conducted the visit from the Neurology clinic of [facility]. The patient participated in the visit at a non-[facility] location selected by the patient, [facility].   I am licensed in [place] where the patient stated they are located. The patient stated that they understood and accepted the privacy and security risks to their information at their location.

## 2024-07-19 ENCOUNTER — PROCEDURE VISIT (OUTPATIENT)
Dept: NEUROLOGY | Facility: CLINIC | Age: 44
End: 2024-07-19
Payer: COMMERCIAL

## 2024-07-19 VITALS
DIASTOLIC BLOOD PRESSURE: 82 MMHG | SYSTOLIC BLOOD PRESSURE: 118 MMHG | WEIGHT: 200 LBS | HEIGHT: 71 IN | BODY MASS INDEX: 28 KG/M2

## 2024-07-19 DIAGNOSIS — G51.31 HEMIFACIAL SPASM OF RIGHT SIDE OF FACE: Primary | ICD-10-CM

## 2024-07-19 RX ORDER — FOLIC ACID 1 MG/1
1000 TABLET ORAL
COMMUNITY
Start: 2024-07-03

## 2024-07-19 RX ORDER — METHOTREXATE 2.5 MG/1
20 TABLET ORAL
COMMUNITY
Start: 2024-07-10

## 2024-07-19 NOTE — PROCEDURES
"  HPI:         R hf spasm  Relief with botulinum toxin (Xeomin) prior inj's      Review of Systems            Current Outpatient Medications:     biotin/keratin (BIOTIN PLUS KERATIN ORAL), Take 5,000 mcg by mouth. 5000 mcg biotin-500mg kerotin, Disp: , Rfl:     cholecalciferol, vitD3,/vit K2 (VITAMIN D3-VITAMIN K2 ORAL), Take 5,000 Units by mouth Daily. Takes every 3 days, Disp: , Rfl:     curcumin-phosphatidylcholine 500 mg Cap, Take by mouth., Disp: , Rfl:     famotidine (PEPCID) 20 MG tablet, Take 20 mg by mouth once daily., Disp: , Rfl:     ferrous sulfate (SLOW RELEASE IRON ORAL), Take 45 mg by mouth., Disp: , Rfl:     folic acid (FOLVITE) 1 MG tablet, Take 1,000 mcg by mouth., Disp: , Rfl:     gabapentin (NEURONTIN) 100 MG capsule, Take by mouth every evening. 2 tabs whs, Disp: , Rfl:     gabapentin (NEURONTIN) 300 MG capsule, Take 300 mg by mouth every evening., Disp: , Rfl:     indomethacin (INDOCIN SR) 75 mg CpSR CR capsule, Take 75 mg by mouth every evening., Disp: , Rfl:     krill oil 500 mg Cap, Take by mouth., Disp: , Rfl:     methotrexate 2.5 MG Tab, Take 20 mg by mouth every 7 days., Disp: , Rfl:     mv-min/iron/folic/calcium/vitK (WOMEN'S MULTIVITAMIN ORAL), Take 1 tablet by mouth Daily. 1 a Day, Disp: , Rfl:     omeprazole (PRILOSEC) 20 MG capsule, Take 20 mg by mouth., Disp: , Rfl:     SYNTHROID 50 mcg tablet, Take 50 mcg by mouth., Disp: , Rfl:     tiZANidine (ZANAFLEX) 2 MG tablet, Take 2 mg by mouth 2 (two) times daily., Disp: , Rfl:     TURMERIC ORAL, Take by mouth., Disp: , Rfl:     ZEPBOUND 7.5 mg/0.5 mL PnIj, Inject 0.75 mg into the skin every 7 days., Disp: , Rfl:      Objective:        Exam    /82   Ht 5' 11" (1.803 m)   Wt 90.7 kg (200 lb)   BMI 27.89 kg/m²     R HF Spasm       Informed consent had been obtained     Botulinum toxin dilution 2cc per 100 u           No noted immediate complications         Assessment/Plan:       Problem List Items Addressed This Visit          " Neuro    Hemifacial spasm - Primary       Other comments/ follow up:          Benson Alvarez MD

## 2024-07-23 ENCOUNTER — APPOINTMENT (OUTPATIENT)
Dept: RADIOLOGY | Facility: HOSPITAL | Age: 44
End: 2024-07-23
Payer: COMMERCIAL

## 2024-07-23 DIAGNOSIS — Z80.3 FAMILY HISTORY OF BREAST CANCER: ICD-10-CM

## 2024-07-23 DIAGNOSIS — Z91.89 AT HIGH RISK FOR BREAST CANCER: ICD-10-CM

## 2024-07-23 PROCEDURE — 77049 MRI BREAST C-+ W/CAD BI: CPT | Mod: TC

## 2024-07-23 PROCEDURE — 25500020 PHARM REV CODE 255

## 2024-07-23 PROCEDURE — A9577 INJ MULTIHANCE: HCPCS

## 2024-07-23 PROCEDURE — 77049 MRI BREAST C-+ W/CAD BI: CPT | Mod: 26,,, | Performed by: RADIOLOGY

## 2024-07-23 RX ADMIN — GADOBENATE DIMEGLUMINE 20 ML: 529 INJECTION, SOLUTION INTRAVENOUS at 09:07

## 2025-02-03 ENCOUNTER — HOSPITAL ENCOUNTER (OUTPATIENT)
Dept: RADIOLOGY | Facility: HOSPITAL | Age: 45
Discharge: HOME | End: 2025-02-03
Payer: COMMERCIAL

## 2025-02-03 DIAGNOSIS — Z80.3 FAMILY HISTORY OF BREAST CANCER: ICD-10-CM

## 2025-02-03 DIAGNOSIS — Z91.89 AT HIGH RISK FOR BREAST CANCER: ICD-10-CM

## 2025-02-03 DIAGNOSIS — Z12.31 SCREENING MAMMOGRAM FOR BREAST CANCER: ICD-10-CM

## 2025-02-03 PROCEDURE — 77063 BREAST TOMOSYNTHESIS BI: CPT | Mod: 26,,, | Performed by: RADIOLOGY

## 2025-02-03 PROCEDURE — 77067 SCR MAMMO BI INCL CAD: CPT | Mod: 26,,, | Performed by: RADIOLOGY

## 2025-02-03 PROCEDURE — 77067 SCR MAMMO BI INCL CAD: CPT | Mod: TC

## 2025-06-12 NOTE — PROGRESS NOTES
[facility] Breast Surg  Breast Surgical Oncology  High Risk Follow Up Visit       Referring Provider:  Jacey Pettit     PCP: Networked reference to record PCT      Care Team: Patient Care Team:  No, Primary Doctor as PCP - Azucena Davis MD as Consulting Physician (Obstetrics and Gynecology)  Jacey Pettit PA-C as Physician Assistant (General Surgery)    Chief Complaint:   Chief Complaint   Patient presents with    Follow-up     Patient reports no breast related concerns        Subjective:      Interval: 2025 Patient presents for high risk follow up visit. She is doing well today. Patient has history of fibrocystic breast. She denies any breast issues including rashes, redness, pain, swelling, nipple discharge, or new lumps/masses. Performs self breast exams monthly. She had a hysterectomy without BSO, so she no longer has cycles.  She does experience intermittent hot flashes, however, she is not experiencing any other menopausal symptoms at this time.  In the interim, patient underwent screening mammogram in February which resulted as benign.  She is due for breast MRI in 2025.  She is still staying active.  She did have a paternal female 2nd cousin who was diagnosed with breast cancer at age 70 in the interval.  She denies any other significant interval changes or changes in family history.  She has no other complaints or concerns today.      History of Present Illness:  Zina Peguero  is a pleasant female patient who initially presented at 42 years old for evaluation of risk for breast cancer based on  the Tyrer - Cuzick Breast Cancer Risk Model (> 20% indicates elevated lifetime risk). Her lifetime risk is calculated to be 24.1%. Haily 5 year risk 0.7%.    Imagin23 SCR MG w/Lance: The breasts are heterogeneously dense, which may obscure small masses. There is no mammographic evidence of malignancy.  23 Breast MRI: No MRI evidence  of malignancy in either breast. Bilateral cystic mastopathy is benign.  BI-RADS 2: Benign.  2/2/2024 SC MG - There is no mammographic evidence of malignancy. BI-RADS: 1 Negative   7/24/2024 Breast MRI - No MR evidence of malignancy in either breast. BI-RADS: 2 Benign   2/6/2025 SC MG - There is no mammographic evidence of malignancy. BI-RADS: 2 Benign       Pathology: NONE      OB / GYN History   Menarche Onset:13  Menopause: Menopause: premenopausal    Hormonal birth control (duration): 24 years  Pregnancies: 0  Age at first child birth: NA  Child births: 0  Hysterectomy/Oophorectomy: hysterectomy without BSO, at age 42 (Fibroids)  HRT: no    Family History of Cancer (& age at diagnosis):  -   Family History   Problem Relation Name Age of Onset    Thyroid disease Mother Julia     Skin cancer Mother Julia         Squamous cell    Skin cancer Father Carlos 67        Squamous cell    Hypertension Father Carlos     Rheum arthritis Maternal Grandmother Milagro Jamesalerené     Pancreatic cancer Maternal Grandfather  79    Melanoma Maternal Uncle  50        toe nail    Breast cancer Paternal Aunt Lima Katy 65    Lung cancer Other Pat great uncle 50    Prostate cancer Other Pat great uncle 50    Lung cancer Other Pat great uncle     Breast cancer Other Mat great aunt         Lifestyle:   BMI: Body mass index is 21.39 kg/m².     Other:  # of breast biopsies (when and pathology results): 0  MG breast density: BIRADS C  Prior thoracic RT: none  Genetic testing: Patient has had genetic testing done.  Results were negative besides three (3) variants of uncertain significance (VUS): AXIN2 p.F570V, BAP1 p.R383H, CDH1 p.P822S.  Ashkenazi Congregational descent: No    Other History:     Past Medical History:   Diagnosis Date    Hashimoto's disease     Hemifacial spasm     Right side    Uterine fibroid         Past Surgical History:   Procedure Laterality Date    BRAIN SURGERY  7/2017; 12/2017    HYSTERECTOMY      Partial Hysterectomy 4/2023     MANDIBLE SURGERY Bilateral     neurosurgery Right         Social History     Socioeconomic History    Marital status: Single   Tobacco Use    Smoking status: Never    Smokeless tobacco: Never   Substance and Sexual Activity    Alcohol use: Not Currently     Alcohol/week: 2.0 standard drinks of alcohol     Types: 2 Glasses of wine per week    Drug use: Never    Sexual activity: Not Currently     Partners: Male     Birth control/protection: None          There is no immunization history on file for this patient.     Medications/Allergies:     Current Outpatient Medications:     adalimumab-aacf 40 mg/0.8 mL PnKt, Inject into the skin., Disp: , Rfl:     biotin/keratin (BIOTIN PLUS KERATIN ORAL), Take 5,000 mcg by mouth. 5000 mcg biotin-500mg kerotin, Disp: , Rfl:     cholecalciferol, vitD3,/vit K2 (VITAMIN D3-VITAMIN K2 ORAL), Take 5,000 Units by mouth Daily. Takes every 3 days, Disp: , Rfl:     curcumin-phosphatidylcholine 500 mg Cap, Take by mouth., Disp: , Rfl:     famotidine (PEPCID) 20 MG tablet, Take 20 mg by mouth once daily., Disp: , Rfl:     ferrous sulfate (SLOW RELEASE IRON ORAL), Take 45 mg by mouth., Disp: , Rfl:     gabapentin (NEURONTIN) 100 MG capsule, Take by mouth every evening. 2 tabs whs, Disp: , Rfl:     gabapentin (NEURONTIN) 300 MG capsule, Take 300 mg by mouth every evening., Disp: , Rfl:     indomethacin (INDOCIN SR) 75 mg CpSR CR capsule, Take 75 mg by mouth every evening., Disp: , Rfl:     krill oil 500 mg Cap, Take by mouth., Disp: , Rfl:     methotrexate 25 mg/mL injection, Inject into the skin., Disp: , Rfl:     minoxidiL (LONITEN) 2.5 MG tablet, Take by mouth., Disp: , Rfl:     mv-min/iron/folic/calcium/vitK (WOMEN'S MULTIVITAMIN ORAL), Take 1 tablet by mouth Daily. 1 a Day, Disp: , Rfl:     TURMERIC ORAL, Take by mouth., Disp: , Rfl:     folic acid (FOLVITE) 1 MG tablet, Take 1,000 mcg by mouth. (Patient not taking: Reported on 6/13/2025), Disp: , Rfl:     omeprazole (PRILOSEC) 20 MG  capsule, Take 20 mg by mouth. (Patient not taking: Reported on 6/13/2025), Disp: , Rfl:     SYNTHROID 50 mcg tablet, Take 50 mcg by mouth. (Patient not taking: Reported on 6/13/2025), Disp: , Rfl:     tiZANidine (ZANAFLEX) 2 MG tablet, Take 2 mg by mouth 2 (two) times daily. (Patient not taking: Reported on 6/13/2025), Disp: , Rfl:     traZODone (DESYREL) 50 MG tablet, Take 50 mg by mouth nightly as needed., Disp: , Rfl:     ZEPBOUND 7.5 mg/0.5 mL PnIj, Inject 0.75 mg into the skin every 7 days. (Patient not taking: Reported on 6/13/2025), Disp: , Rfl:     Current Facility-Administered Medications:     incobotulinumtoxinA injection 100 Units, 100 Units, Intramuscular, 1 time in Clinic/HOD,     Review of patient's allergies indicates:  No Known Allergies     Review of Systems:    Pertinent positives documented in history.      Objective/Physical Exam     Vitals:    06/13/25 0852   BP: 119/85   Pulse: 77   Resp: 18   Temp: 97.7 °F (36.5 °C)         General: The patient is awake, alert and oriented times three. The patient is well nourished and in no acute distress.  Neck: There is no evidence of palpable cervical, supraclavicular or axillary adenopathy. The neck is supple. The thyroid is not enlarged.  Musculoskeletal: The patient has a normal range of motion of her bilateral upper extremities.  Chest: Examination of the chest wall fails to reveal any obvious abnormalities. Nonlabored breathing, symmetric expansion.  Breast:  Right:  Examination of right breast fails to reveal any dominant masses or areas of significant focal nodularity. The nipple is everted without evidence of discharge. There is no skin dimpling with movement of the pectoralis. There are no significant skin changes overlying the breast.   Left:  Examination of right breast fails to reveal any dominant masses or areas of significant focal nodularity. The nipple is everted without evidence of discharge. There is no skin dimpling with movement of the  pectoralis. There are no significant skin changes overlying the breast.  Abdomen: The abdomen is soft, flat, nontender and nondistended.  Integumentary: no rashes or skin lesions present  Neurologic: cranial nerves intact, no signs of peripheral neurological deficit, motor/sensory function intact     Assessment and Plan     Patient Active Problem List   Diagnosis    Hemifacial spasm       Zina was seen today for follow-up.    Diagnoses and all orders for this visit:    At high risk for breast cancer  -     MRI Screening Breast W/WO Contrast, W/CAD, Jared; Future  -     Mammo Digital Screening Bilat w/ Lance (XPD); Future    Screening mammogram for breast cancer  -     Mammo Digital Screening Bilat w/ Lance (XPD); Future    Family history of breast cancer  -     MRI Screening Breast W/WO Contrast, W/CAD, Jared; Future  -     Mammo Digital Screening Bilat w/ Lance (XPD); Future        --------------------------------------------------------------------------------------------------------------    PLAN:    1. Lifestyle - Healthy lifestyle guidelines were reviewed. She was encouraged to engage in regular exercise, maintain a healthy body weight, and avoid excessive alcohol consumption. Healthy nutritional guidelines were also discussed. Self-breast examination was reviewed with the patient in detail and she was encouraged to perform this on a monthly basis.    2. Surveillance - She desires undergoing high risk screening with annual screening mammograms and breast MRIs. In the absence of significant clinical findings in the interval, I recommend she undergo breast MRI in August 2025 (currently booking out to Nov 2025) and SC MG will be due in February 2026.     3. Prevention - We had a brief discussion/education about indications for preventative mastectomy or chemoprevention.  These methods are not recommended to her at this time.    4. Genetics - Patient has had genetic testing done.  Results were negative besides three  (3) variants of uncertain significance (VUS): AXIN2 p.F570V, BAP1 p.R383H, CDH1 p.P822S.    5. Other - Continue to see OB/GYN for CBE. Recommend two CBE a year. One with us and one with your OB/GYN Provider. We recommend them to be at a six month interval.     6. Follow-up:  RTC in 1 year.      All of her questions were answered.     ROIBN SueC     --------------------------------------------------------------------------------------------------------------  --------------------------------------------------------------------------------------------------------------    Total time on the date of the visit ranged from 30-39 mins ([id number]). Total time includes both face-to-face and non-face-to-face time personally spent by myself on the day of the visit.    Non-face-to-face time included:  _X_ preparing to see the patient such as reviewing the patient record  __ obtaining and reviewing separately obtained history  _X_ independently interpreting results  _X_ documenting clinical information in electronic health record.    Face-to-face time included:  _X_ performing an appropriate history and examination  _X_ communicating results to the patient  _X_ counseling and educating the patient  __ ordering appropriate medications  __ ordering appropriate tests  _X_ ordering appropriate procedures (including follow-up)  _X_ answering any questions the patient had    Total Time spent on date of visit: 35 minutes

## 2025-06-13 ENCOUNTER — OFFICE VISIT (OUTPATIENT)
Dept: SURGERY | Facility: CLINIC | Age: 45
End: 2025-06-13
Payer: COMMERCIAL

## 2025-06-13 VITALS
WEIGHT: 153.38 LBS | SYSTOLIC BLOOD PRESSURE: 119 MMHG | OXYGEN SATURATION: 100 % | HEART RATE: 77 BPM | BODY MASS INDEX: 21.47 KG/M2 | RESPIRATION RATE: 18 BRPM | DIASTOLIC BLOOD PRESSURE: 85 MMHG | TEMPERATURE: 98 F | HEIGHT: 71 IN

## 2025-06-13 DIAGNOSIS — Z80.3 FAMILY HISTORY OF BREAST CANCER: ICD-10-CM

## 2025-06-13 DIAGNOSIS — Z91.89 AT HIGH RISK FOR BREAST CANCER: Primary | ICD-10-CM

## 2025-06-13 DIAGNOSIS — Z12.31 SCREENING MAMMOGRAM FOR BREAST CANCER: ICD-10-CM

## 2025-06-13 PROCEDURE — 99999 PR PBB SHADOW E&M-EST. PATIENT-LVL V: CPT | Mod: PBBFAC,,,

## 2025-06-13 RX ORDER — ADALIMUMAB 40MG/0.8ML
KIT SUBCUTANEOUS
COMMUNITY
Start: 2025-06-02

## 2025-06-13 RX ORDER — TRAZODONE HYDROCHLORIDE 50 MG/1
50 TABLET ORAL NIGHTLY PRN
COMMUNITY

## 2025-06-13 RX ORDER — MINOXIDIL 2.5 MG/1
TABLET ORAL
COMMUNITY
Start: 2025-06-12

## 2025-06-13 RX ORDER — METHOTREXATE 25 MG/ML
INJECTION, SOLUTION INTRAMUSCULAR; INTRATHECAL; INTRAVENOUS; SUBCUTANEOUS
COMMUNITY
Start: 2025-06-09